# Patient Record
Sex: FEMALE | Race: BLACK OR AFRICAN AMERICAN | HISPANIC OR LATINO | Employment: FULL TIME | URBAN - METROPOLITAN AREA
[De-identification: names, ages, dates, MRNs, and addresses within clinical notes are randomized per-mention and may not be internally consistent; named-entity substitution may affect disease eponyms.]

---

## 2023-12-22 PROCEDURE — 87624 HPV HI-RISK TYP POOLED RSLT: CPT | Mod: ORL | Performed by: STUDENT IN AN ORGANIZED HEALTH CARE EDUCATION/TRAINING PROGRAM

## 2023-12-29 ENCOUNTER — LAB REQUISITION (OUTPATIENT)
Dept: LAB | Facility: CLINIC | Age: 53
End: 2023-12-29
Payer: COMMERCIAL

## 2023-12-29 ENCOUNTER — HOSPITAL ENCOUNTER (OUTPATIENT)
Dept: GENERAL RADIOLOGY | Facility: CLINIC | Age: 53
Discharge: HOME | End: 2023-12-29
Attending: STUDENT IN AN ORGANIZED HEALTH CARE EDUCATION/TRAINING PROGRAM | Admitting: STUDENT IN AN ORGANIZED HEALTH CARE EDUCATION/TRAINING PROGRAM
Payer: COMMERCIAL

## 2023-12-29 DIAGNOSIS — R76.12 POSITIVE QUANTIFERON-TB GOLD TEST: ICD-10-CM

## 2023-12-29 PROCEDURE — 71046 X-RAY EXAM CHEST 2 VIEWS: CPT | Mod: 26 | Performed by: RADIOLOGY

## 2023-12-29 PROCEDURE — 71046 X-RAY EXAM CHEST 2 VIEWS: CPT

## 2024-01-03 LAB
HUMAN PAPILLOMA VIRUS 16 DNA: NEGATIVE
HUMAN PAPILLOMA VIRUS 18 DNA: NEGATIVE
HUMAN PAPILLOMA VIRUS FINAL DIAGNOSIS: NORMAL
HUMAN PAPILLOMA VIRUS OTHER HR: NEGATIVE

## 2024-01-24 ENCOUNTER — TRANSCRIBE ORDERS (OUTPATIENT)
Dept: OTHER | Age: 54
End: 2024-01-24

## 2024-01-24 ENCOUNTER — PATIENT OUTREACH (OUTPATIENT)
Dept: ONCOLOGY | Facility: CLINIC | Age: 54
End: 2024-01-24
Payer: COMMERCIAL

## 2024-01-24 DIAGNOSIS — D84.81: Primary | ICD-10-CM

## 2024-01-24 DIAGNOSIS — D47.2: Primary | ICD-10-CM

## 2024-01-24 NOTE — PROGRESS NOTES
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 Abnormal SPEP was noted so further evaluation was done with additional labs and urine testing.     PROTEIN, TOTAL AND PROTEIN ELECTROPHORESIS  Order: [id number]  Component  Ref Range & Units 1 mo ago   PROTEIN, TOTAL  6.1 - 8.1 g/dL 7.2   ALBUMIN  3.8 - 4.8 g/dL 4.2   ALPHA-1-GLOBULINS  0.2 - 0.3 g/dL 0.3   ALPHA-2-GLOBULINS  0.5 - 0.9 g/dL 0.8   BETA 1 GLOBULIN  0.4 - 0.6 g/dL 0.4   BETA 2 GLOBULIN  0.2 - 0.5 g/dL 0.4   GAMMA GLOBULINS  0.8 - 1.7 g/dL 1.0   ABNORMAL PROTEIN BAND 1  NONE DETECTED g/dL 0.6 High    INTERPRETATION See Note   Comment: Restricted band (M-spike) migrating in the gamma region. Consider  serum immunofixation to rule out a monoclonal protein if clinically  indicated.     PROTEIN ELECTROPHORESIS, RANDOM URINE  Specimen: Urine - Urine specimen (specimen)  Component  Ref Range & Units 2 wk ago Comments   CREATININE, RANDOM URINE  20 - 275 mg/dL 34    PROTEIN/CREATININE RATIO  24 - 184 NOTE THE PROTEIN VALUE IS LESS THAN 4 MG/DL  THEREFORE WE ARE UNABLE TO CALCULATE  EXCRETION AND/OR CREATININE RATIO.     PROTEIN/CREATININE RATIO  0.024 - 0.184 NOTE    PROTEIN, TOTAL, RANDOM UR  5 - 24 mg/dL <4 Low  Verified by repeat analysis.   ALBUMIN  % 100    ALPHA-1-GLOBULINS  % 0    ALPHA-2-GLOBULINS  % 0    BETA GLOBULINS  % 0    GAMMA GLOBULINS  % 0    INTERPRETATION See Note  Protein electrophoresis pattern appears normal.     KAPPA LAMBDA LIGHT CHAINS W/CALCULATION, URINE 24HR  Specimen: Urine - Urine specimen obtained by  clean catch procedure (specimen)  Component  Ref Range & Units 2 wk ago Comments   MEASURED KAPPA CHAINS  <2.00 mg/dL <1.00    TOTAL KAPPA CHAINS See Note Unable to accurately calculate since one or more  components is below detection limit.   MEASURED LAMBDA CHAINS  <2.00 mg/dL <1.00    TOTAL LAMBDA CHAINS See Note Unable to accurately calculate since one or more  components is below detection limit.  Due to individual variations in 24 hour urine volume,  no reference ranges exist for Total Kappa Chains and  Total Lambda Chains.   COMMENT See Below This test measures primarily kappa and lambda light  chains that are bound to the heavy chains of IgG, IgA,  or IgM in urine, however, some free light chains can  also be detected with this method. If free light chain  multiple myeloma is suspected, please order free kappa  and free lambda light chains for a more accurate  measurement of the free light chains.        Records Location: Care Everywhere and See Bookmarked material     Records Needed: NA     Additional testing needed prior to consult: JUAN    Payor: [employer] / Plan: [employer]AP AND [employer] / Product Type: Indemnity /     January 24, 2024  Referral received and reviewed. Forwarded to NPS to schedule.     Greta RUBALCAVAN, RN   Oncology Nurse Navigator   [facility]   [phone] / [phone]

## 2024-02-23 ENCOUNTER — ONCOLOGY VISIT (OUTPATIENT)
Dept: ONCOLOGY | Facility: HOSPITAL | Age: 54
End: 2024-02-23
Attending: STUDENT IN AN ORGANIZED HEALTH CARE EDUCATION/TRAINING PROGRAM
Payer: COMMERCIAL

## 2024-02-23 ENCOUNTER — LAB (OUTPATIENT)
Dept: INFUSION THERAPY | Facility: HOSPITAL | Age: 54
End: 2024-02-23
Attending: STUDENT IN AN ORGANIZED HEALTH CARE EDUCATION/TRAINING PROGRAM
Payer: COMMERCIAL

## 2024-02-23 VITALS
OXYGEN SATURATION: 100 % | DIASTOLIC BLOOD PRESSURE: 78 MMHG | RESPIRATION RATE: 18 BRPM | HEART RATE: 86 BPM | SYSTOLIC BLOOD PRESSURE: 141 MMHG | WEIGHT: 140 LBS | HEIGHT: 62 IN | BODY MASS INDEX: 25.76 KG/M2

## 2024-02-23 DIAGNOSIS — D47.2 MGUS (MONOCLONAL GAMMOPATHY OF UNKNOWN SIGNIFICANCE): Primary | ICD-10-CM

## 2024-02-23 LAB
ALBUMIN SERPL BCG-MCNC: 4.7 G/DL (ref 3.5–5.2)
ALP SERPL-CCNC: 83 U/L (ref 40–150)
ALT SERPL W P-5'-P-CCNC: 10 U/L (ref 0–50)
ANION GAP SERPL CALCULATED.3IONS-SCNC: 11 MMOL/L (ref 7–15)
AST SERPL W P-5'-P-CCNC: 16 U/L (ref 0–45)
BASOPHILS # BLD AUTO: 0 10E3/UL (ref 0–0.2)
BASOPHILS NFR BLD AUTO: 0 %
BILIRUB SERPL-MCNC: 0.2 MG/DL
BUN SERPL-MCNC: 11.7 MG/DL (ref 6–20)
CALCIUM SERPL-MCNC: 10.1 MG/DL (ref 8.6–10)
CHLORIDE SERPL-SCNC: 103 MMOL/L (ref 98–107)
CREAT SERPL-MCNC: 0.66 MG/DL (ref 0.51–0.95)
DEPRECATED HCO3 PLAS-SCNC: 26 MMOL/L (ref 22–29)
EGFRCR SERPLBLD CKD-EPI 2021: >90 ML/MIN/1.73M2
EOSINOPHIL # BLD AUTO: 0 10E3/UL (ref 0–0.7)
EOSINOPHIL NFR BLD AUTO: 0 %
ERYTHROCYTE [DISTWIDTH] IN BLOOD BY AUTOMATED COUNT: 13.1 % (ref 10–15)
GLUCOSE SERPL-MCNC: 134 MG/DL (ref 70–99)
HCT VFR BLD AUTO: 41.2 % (ref 35–47)
HGB BLD-MCNC: 13.3 G/DL (ref 11.7–15.7)
IMM GRANULOCYTES # BLD: 0 10E3/UL
IMM GRANULOCYTES NFR BLD: 0 %
LDH SERPL L TO P-CCNC: 173 U/L (ref 0–250)
LYMPHOCYTES # BLD AUTO: 2.2 10E3/UL (ref 0.8–5.3)
LYMPHOCYTES NFR BLD AUTO: 43 %
MCH RBC QN AUTO: 30 PG (ref 26.5–33)
MCHC RBC AUTO-ENTMCNC: 32.3 G/DL (ref 31.5–36.5)
MCV RBC AUTO: 93 FL (ref 78–100)
MONOCYTES # BLD AUTO: 0.3 10E3/UL (ref 0–1.3)
MONOCYTES NFR BLD AUTO: 5 %
NEUTROPHILS # BLD AUTO: 2.6 10E3/UL (ref 1.6–8.3)
NEUTROPHILS NFR BLD AUTO: 52 %
NRBC # BLD AUTO: 0 10E3/UL
NRBC BLD AUTO-RTO: 0 /100
PLATELET # BLD AUTO: 287 10E3/UL (ref 150–450)
POTASSIUM SERPL-SCNC: 3.8 MMOL/L (ref 3.4–5.3)
PROT SERPL-MCNC: 7.7 G/DL (ref 6.4–8.3)
RBC # BLD AUTO: 4.43 10E6/UL (ref 3.8–5.2)
SODIUM SERPL-SCNC: 140 MMOL/L (ref 135–145)
TOTAL PROTEIN SERUM FOR ELP: 7.4 G/DL (ref 6.4–8.3)
WBC # BLD AUTO: 5.1 10E3/UL (ref 4–11)

## 2024-02-23 PROCEDURE — 84165 PROTEIN E-PHORESIS SERUM: CPT | Mod: 26 | Performed by: PATHOLOGY

## 2024-02-23 PROCEDURE — 86334 IMMUNOFIX E-PHORESIS SERUM: CPT | Performed by: PATHOLOGY

## 2024-02-23 PROCEDURE — 84166 PROTEIN E-PHORESIS/URINE/CSF: CPT | Mod: 26 | Performed by: PATHOLOGY

## 2024-02-23 PROCEDURE — G2211 COMPLEX E/M VISIT ADD ON: HCPCS | Performed by: INTERNAL MEDICINE

## 2024-02-23 PROCEDURE — 86335 IMMUNFIX E-PHORSIS/URINE/CSF: CPT | Mod: 26 | Performed by: PATHOLOGY

## 2024-02-23 PROCEDURE — 80053 COMPREHEN METABOLIC PANEL: CPT | Performed by: INTERNAL MEDICINE

## 2024-02-23 PROCEDURE — 84155 ASSAY OF PROTEIN SERUM: CPT | Performed by: INTERNAL MEDICINE

## 2024-02-23 PROCEDURE — 82784 ASSAY IGA/IGD/IGG/IGM EACH: CPT | Performed by: INTERNAL MEDICINE

## 2024-02-23 PROCEDURE — G0463 HOSPITAL OUTPT CLINIC VISIT: HCPCS | Performed by: INTERNAL MEDICINE

## 2024-02-23 PROCEDURE — 83521 IG LIGHT CHAINS FREE EACH: CPT | Performed by: INTERNAL MEDICINE

## 2024-02-23 PROCEDURE — 82232 ASSAY OF BETA-2 PROTEIN: CPT | Performed by: INTERNAL MEDICINE

## 2024-02-23 PROCEDURE — 83615 LACTATE (LD) (LDH) ENZYME: CPT | Performed by: INTERNAL MEDICINE

## 2024-02-23 PROCEDURE — 85025 COMPLETE CBC W/AUTO DIFF WBC: CPT | Performed by: INTERNAL MEDICINE

## 2024-02-23 PROCEDURE — 99204 OFFICE O/P NEW MOD 45 MIN: CPT | Performed by: INTERNAL MEDICINE

## 2024-02-23 PROCEDURE — 86335 IMMUNFIX E-PHORSIS/URINE/CSF: CPT | Performed by: PATHOLOGY

## 2024-02-23 PROCEDURE — 84166 PROTEIN E-PHORESIS/URINE/CSF: CPT | Performed by: PATHOLOGY

## 2024-02-23 PROCEDURE — 86334 IMMUNOFIX E-PHORESIS SERUM: CPT | Mod: 26 | Performed by: PATHOLOGY

## 2024-02-23 PROCEDURE — 84165 PROTEIN E-PHORESIS SERUM: CPT | Mod: TC | Performed by: PATHOLOGY

## 2024-02-23 PROCEDURE — 36415 COLL VENOUS BLD VENIPUNCTURE: CPT | Performed by: INTERNAL MEDICINE

## 2024-02-23 ASSESSMENT — PAIN SCALES - GENERAL: PAINLEVEL: NO PAIN (0)

## 2024-02-23 NOTE — LETTER
"    2/23/2024         RE: Candelario Lou  [address]  [facility] [address]        Dear Colleague,    Thank you for referring your patient, Candelario Lou, to the [facility]. Please see a copy of my visit note below.    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    Weight as of this encounter: 63.5 kg (140 lb). Body surface area is 1.67 meters squared.  No Pain (0) Comment: Data Unavailable   No LMP recorded.  Allergies reviewed: Yes  Medications reviewed: Yes    Medications: Medication refills not needed today.  Pharmacy name entered into [employer]: [id number] - [place], [place] - [address]    Frailty Screening:   Is the patient here for a new oncology consult visit in cancer care? 2. 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  IgG lambda monoclonal gammopathy     I reviewed the prior lab work from December 2023.  She has normal liver function, kidney function and calcium.  Serum protein electrophoresis showed a monoclonal peak of 0.6 g/dL and immunofixation identified IgG lambda " monoclonal gammopathy.   I reviewed that she most likely has MGUS which is asymptomatic plasma cell disease present in about 3% of general population over 50 years of age.  This is a benign or premalignant condition with the risk of progression to multiple myeloma or about 1 %/year.  I also discussed the spectrum of plasma cell disease including MGUS, smoldering myeloma and multiple myeloma.  There is no treatment needed for MGUS.  However, it is recommended to monitor by labs and clinical examination on routine basis.  I provided education material for review.  I recommended to complete additional studies as below.  She requested to mail the results when they are available.   Follow-up with me in about 6 months with labs a week prior.      The longitudinal plan of care for the diagnosis(es)/condition(s) as documented were addressed during this visit. Due to the added complexity in care, I will continue to support Candelario in the subsequent management and with ongoing continuity of care.    Encounter Diagnoses:    Problem List Items Addressed This Visit    None  Visit Diagnoses       MGUS (monoclonal gammopathy of unknown significance)    -  Primary    Relevant Orders    CBC with Platelets & Differential (Completed)    Comprehensive metabolic panel (Completed)    Protein electrophoresis    Protein immunofixation urine    Immunoglobulins A G and M    Protein electrophoresis random urine    Protein Immunofixation Serum    Kappa and lambda light chain    Lactate Dehydrogenase (Completed)    Beta 2 microglobulin    CBC with Platelets & Differential    Comprehensive metabolic panel    Protein electrophoresis    Immunoglobulins A G and M    Kappa and lambda light chain            ______________________________________________________________________________    History  Professional Malay  was used for this encounter.    Ms. Candelario Lou is a very pleasant 53 year old female presented today for further  "evaluation of monoclonal gammopathy found during annual physical 8/12/2023.  She does not have any fever, drenching sweats, palpable masses or bone pain.  She does not have frequent infections.  She is originally from [facility].    She does not smoke.  She does not drink alcohol.  She does not use recreational drugs.  [place]  She does not have family history of cancer, including myeloma.    Review of systems.  Apart from describing in history, the remainder of comprehensive ROS was negative.    Past History    No past medical history on file.  No past surgical history on file.  No family history on file.  Social History     Socioeconomic History     Marital status: Single       Allergies    No Known Allergies    Physical Exam    BP (!) 141/78   Pulse 86   Resp 18   Ht 1.575 m (5' 2\")   Wt 63.5 kg (140 lb)   SpO2 100%   BMI 25.61 kg/m      General: alert, awake, not in acute distress  HEENT: Head: Normal, normocephalic, atraumatic.  Eye: Normal external eye, conjunctiva, lids cornea, SEPIDEH.  Nose: Normal external nose, mucus membranes and septum.  Pharynx: Normal buccal mucosa. Normal pharynx.  Neck / Thyroid: Supple, no masses, nodes, nodules or enlargement.  Lymphatics: No abnormally enlarged lymph nodes.  Chest: Normal chest wall and respirations. Clear to auscultation.  Heart: S1 S2 RRR, no murmur.   Abdomen: abdomen is soft without significant tenderness, masses, organomegaly or guarding  Extremities: normal strength, tone, and muscle mass  Skin: normal. no rash or abnormalities  CNS: non focal.    Lab Results    Recent Results (from the past 168 hour(s))   Comprehensive metabolic panel   Result Value Ref Range    Sodium 140 135 - 145 mmol/L    Potassium 3.8 3.4 - 5.3 mmol/L    Carbon Dioxide (CO2) 26 22 - 29 mmol/L    Anion Gap 11 7 - 15 mmol/L    Urea Nitrogen 11.7 6.0 - 20.0 mg/dL    Creatinine 0.66 0.51 - 0.95 mg/dL    GFR Estimate >90 >60 mL/min/1.73m2    Calcium 10.1 (H) 8.6 - " 10.0 mg/dL    Chloride 103 98 - 107 mmol/L    Glucose 134 (H) 70 - 99 mg/dL    Alkaline Phosphatase 83 40 - 150 U/L    AST 16 0 - 45 U/L    ALT 10 0 - 50 U/L    Protein Total 7.7 6.4 - 8.3 g/dL    Albumin 4.7 3.5 - 5.2 g/dL    Bilirubin Total 0.2 <=1.2 mg/dL   Lactate Dehydrogenase   Result Value Ref Range    Lactate Dehydrogenase 173 0 - 250 U/L   CBC with platelets and differential   Result Value Ref Range    WBC Count 5.1 4.0 - 11.0 10e3/uL    RBC Count 4.43 3.80 - 5.20 10e6/uL    Hemoglobin 13.3 11.7 - 15.7 g/dL    Hematocrit 41.2 35.0 - 47.0 %    MCV 93 78 - 100 fL    MCH 30.0 26.5 - 33.0 pg    MCHC 32.3 31.5 - 36.5 g/dL    RDW 13.1 10.0 - 15.0 %    Platelet Count 287 150 - 450 10e3/uL    % Neutrophils 52 %    % Lymphocytes 43 %    % Monocytes 5 %    % Eosinophils 0 %    % Basophils 0 %    % Immature Granulocytes 0 %    NRBCs per 100 WBC 0 <1 /100    Absolute Neutrophils 2.6 1.6 - 8.3 10e3/uL    Absolute Lymphocytes 2.2 0.8 - 5.3 10e3/uL    Absolute Monocytes 0.3 0.0 - 1.3 10e3/uL    Absolute Eosinophils 0.0 0.0 - 0.7 10e3/uL    Absolute Basophils 0.0 0.0 - 0.2 10e3/uL    Absolute Immature Granulocytes 0.0 <=0.4 10e3/uL    Absolute NRBCs 0.0 10e3/uL       Imaging Results    No results found.    40 minutes spent on the date of the encounter doing chart review, history and exam, documentation, communication of the treatment plan with the care team and further activities as noted above.    Signed by: Genesis Rao MD       Again, thank you for allowing me to participate in the care of your patient.        Sincerely,        Genesis Rao MD

## 2024-02-23 NOTE — PROGRESS NOTES
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    Weight as of this encounter: 63.5 kg (140 lb). Body surface area is 1.67 meters squared.  No Pain (0) Comment: Data Unavailable   No LMP recorded.  Allergies reviewed: Yes  Medications reviewed: Yes    Medications: Medication refills not needed today.  Pharmacy name entered into Chart[employer]: [id number] - [place], [place] - [address]    Frailty Screening:   Is the patient here for a new oncology consult visit in cancer care? 2. No      Clinical concerns:  new consult GAURAV Mccarthy            "

## 2024-02-23 NOTE — PROGRESS NOTES
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  IgG lambda monoclonal gammopathy     I reviewed the prior lab work from December 2023.  She has normal liver function, kidney function and calcium.  Serum protein electrophoresis showed a monoclonal peak of 0.6 g/dL and immunofixation identified IgG lambda monoclonal gammopathy.   I reviewed that she most likely has MGUS which is asymptomatic plasma cell disease present in about 3% of general population over 50 years of age.  This is a benign or premalignant condition with the risk of progression to multiple myeloma or about 1 %/year.  I also discussed the spectrum of plasma cell disease including MGUS, smoldering myeloma and multiple myeloma.  There is no treatment needed for MGUS.  However, it is recommended to monitor by labs and clinical examination on routine basis.  I provided education material for review.  I recommended to complete additional studies as below.  She requested to mail the results when they are available.   Follow-up with me in about 6 months with labs a week prior.      The longitudinal plan of care for the diagnosis(es)/condition(s) as documented were addressed during this visit. Due to the added complexity in care, I will continue to support Candelario in the subsequent management and with ongoing continuity of care.    Encounter Diagnoses:    Problem List Items Addressed This Visit    None  Visit Diagnoses       MGUS (monoclonal gammopathy of unknown significance)    -  Primary    Relevant Orders    CBC with Platelets & Differential (Completed)    Comprehensive metabolic panel (Completed)    Protein electrophoresis     "Protein immunofixation urine    Immunoglobulins A G and M    Protein electrophoresis random urine    Protein Immunofixation Serum    Kappa and lambda light chain    Lactate Dehydrogenase (Completed)    Beta 2 microglobulin    CBC with Platelets & Differential    Comprehensive metabolic panel    Protein electrophoresis    Immunoglobulins A G and M    Kappa and lambda light chain            ______________________________________________________________________________    History  Professional Kinyarwanda  was used for this encounter.    Ms. Candelario Lou is a very pleasant 53 year old female presented today for further evaluation of monoclonal gammopathy found during annual physical 8/12/2023.  She does not have any fever, drenching sweats, palpable masses or bone pain.  She does not have frequent infections.  She is originally from [facility] [place].    She does not smoke.  She does not drink alcohol.  She does not use recreational drugs.  [place]  She does not have family history of cancer, including myeloma.    Review of systems.  Apart from describing in history, the remainder of comprehensive ROS was negative.    Past History    No past medical history on file.  No past surgical history on file.  No family history on file.  Social History     Socioeconomic History    Marital status: Single       Allergies    No Known Allergies    Physical Exam    BP (!) 141/78   Pulse 86   Resp 18   Ht 1.575 m (5' 2\")   Wt 63.5 kg (140 lb)   SpO2 100%   BMI 25.61 kg/m      General: alert, awake, not in acute distress  HEENT: Head: Normal, normocephalic, atraumatic.  Eye: Normal external eye, conjunctiva, lids cornea, SEPIDEH.  Nose: Normal external nose, mucus membranes and septum.  Pharynx: Normal buccal mucosa. Normal pharynx.  Neck / Thyroid: Supple, no masses, nodes, nodules or enlargement.  Lymphatics: No abnormally enlarged lymph nodes.  Chest: Normal chest wall and respirations. Clear to " auscultation.  Heart: S1 S2 RRR, no murmur.   Abdomen: abdomen is soft without significant tenderness, masses, organomegaly or guarding  Extremities: normal strength, tone, and muscle mass  Skin: normal. no rash or abnormalities  CNS: non focal.    Lab Results    Recent Results (from the past 168 hour(s))   Comprehensive metabolic panel   Result Value Ref Range    Sodium 140 135 - 145 mmol/L    Potassium 3.8 3.4 - 5.3 mmol/L    Carbon Dioxide (CO2) 26 22 - 29 mmol/L    Anion Gap 11 7 - 15 mmol/L    Urea Nitrogen 11.7 6.0 - 20.0 mg/dL    Creatinine 0.66 0.51 - 0.95 mg/dL    GFR Estimate >90 >60 mL/min/1.73m2    Calcium 10.1 (H) 8.6 - 10.0 mg/dL    Chloride 103 98 - 107 mmol/L    Glucose 134 (H) 70 - 99 mg/dL    Alkaline Phosphatase 83 40 - 150 U/L    AST 16 0 - 45 U/L    ALT 10 0 - 50 U/L    Protein Total 7.7 6.4 - 8.3 g/dL    Albumin 4.7 3.5 - 5.2 g/dL    Bilirubin Total 0.2 <=1.2 mg/dL   Lactate Dehydrogenase   Result Value Ref Range    Lactate Dehydrogenase 173 0 - 250 U/L   CBC with platelets and differential   Result Value Ref Range    WBC Count 5.1 4.0 - 11.0 10e3/uL    RBC Count 4.43 3.80 - 5.20 10e6/uL    Hemoglobin 13.3 11.7 - 15.7 g/dL    Hematocrit 41.2 35.0 - 47.0 %    MCV 93 78 - 100 fL    MCH 30.0 26.5 - 33.0 pg    MCHC 32.3 31.5 - 36.5 g/dL    RDW 13.1 10.0 - 15.0 %    Platelet Count 287 150 - 450 10e3/uL    % Neutrophils 52 %    % Lymphocytes 43 %    % Monocytes 5 %    % Eosinophils 0 %    % Basophils 0 %    % Immature Granulocytes 0 %    NRBCs per 100 WBC 0 <1 /100    Absolute Neutrophils 2.6 1.6 - 8.3 10e3/uL    Absolute Lymphocytes 2.2 0.8 - 5.3 10e3/uL    Absolute Monocytes 0.3 0.0 - 1.3 10e3/uL    Absolute Eosinophils 0.0 0.0 - 0.7 10e3/uL    Absolute Basophils 0.0 0.0 - 0.2 10e3/uL    Absolute Immature Granulocytes 0.0 <=0.4 10e3/uL    Absolute NRBCs 0.0 10e3/uL       Imaging Results    No results found.    40 minutes spent on the date of the encounter doing chart review, history and exam,  documentation, communication of the treatment plan with the care team and further activities as noted above.    Signed by: Genesis Rao MD

## 2024-02-23 NOTE — PATIENT INSTRUCTIONS
"Patient education:   Monoclonal gammopathy of undetermined significance(The Basics)     What is monoclonal gammopathy of undetermined significance?Monoclonal gammopathy of undetermined significance, called \"MGUS\" for short, is a condition that involves 1 type of white blood cell.White blood cells fight infections in the body. They are made in the bone marrow, which is the tissue in the center of your bones.  When people have MGUS, their bone marrow makes too many of 1 type of white blood cell.These white blood cells make a protein called monoclonal protein (M-protein).  MGUS causes no symptoms and, in most cases, does not lead to any problems. But in some cases, MGUS can turn into a serious condition. Oneserious condition is multiple myeloma, which is a cancer of the white blood cells involved in MGUS.  What are the symptoms of MGUS?MGUS does not cause any symptoms. Your doctor or nurse will suspect you have it after youhave lab tests done for another reason.  Is there a test for MGUS?Yes. If your doctor or nurse suspects you have MGUS from other lab test results, he or she will do an exam and further tests. These can include:  ?Blood tests  ?Urine tests  ?A bone marrow biopsy - For this test, a doctor takes a very small sample of the bone marrow. Then another doctor looks at the sample under a microscope to see which cellsare present.  ?Imaging tests, such as X-rays, CT scans, PET scans, or MRI scans - Imaging tests create pictures of the inside of the body.  How is MGUS treated?MGUS does not need treatment. But your doctorwill monitor your condition closely. That way, he or she will know if your MGUS turns into a serious condition that does need treatment.  To monitor your condition, your doctor will talk with you and do exams on a regularbasis. He or she might also order repeat blood and urine tests. How often these tests are done depends on your individual situation.  People with MGUS have a higher-than-normal " chance of breaking a bone. Because of this,your doctor will check you for osteoporosis, a disease that makes your bones weak. If you have osteoporosis, he or she will treat it. If you don't have osteoporosis, your doctor will recommend things you can do to help keepyour bones strong. This includes getting enough calcium and vitamin D.  What symptoms should I watch for?You should watch for symptoms that could mean your MGUS has changed into a serious condition. This change can happenquickly. Let your doctor or nurse know right away if you have any of the following symptoms:  ?Bone pain  ?Feeling more tired or weak than usual  ?Fever  ?Night sweats that soak yourclothes  ?Headache or dizziness  ?Weight loss  ?Numbness, tingling, or weakness in the chest, lower back, or legs  ?Blurry vision or trouble hearing  ?Bleeding more than usual  These symptoms can also be caused by other conditions that are not serious. But your doctor or nurse will want to check that your MGUS hasn't changed into a condition that needs treatment.

## 2024-02-26 LAB
B2 MICROGLOB TUMOR MARKER SER-MCNC: 1.4 MG/L
IGA SERPL-MCNC: 203 MG/DL (ref 84–499)
IGG SERPL-MCNC: 972 MG/DL (ref 610–1616)
IGM SERPL-MCNC: 200 MG/DL (ref 35–242)
KAPPA LC FREE SER-MCNC: 1.54 MG/DL (ref 0.33–1.94)
KAPPA LC FREE/LAMBDA FREE SER NEPH: 1.21 {RATIO} (ref 0.26–1.65)
LAMBDA LC FREE SERPL-MCNC: 1.27 MG/DL (ref 0.57–2.63)

## 2024-03-01 LAB
ALBUMIN SERPL ELPH-MCNC: 4.5 G/DL (ref 3.7–5.1)
ALPHA1 GLOB SERPL ELPH-MCNC: 0.3 G/DL (ref 0.2–0.4)
ALPHA2 GLOB SERPL ELPH-MCNC: 0.8 G/DL (ref 0.5–0.9)
B-GLOBULIN SERPL ELPH-MCNC: 0.8 G/DL (ref 0.6–1)
GAMMA GLOB SERPL ELPH-MCNC: 1 G/DL (ref 0.7–1.6)
M PROTEIN SERPL ELPH-MCNC: 0.5 G/DL
PROT ELPH PNL UR ELPH: NORMAL
PROT PATTERN SERPL ELPH-IMP: ABNORMAL
PROT PATTERN SERPL IFE-IMP: NORMAL
PROT PATTERN UR ELPH-IMP: NORMAL